# Patient Record
(demographics unavailable — no encounter records)

---

## 2024-10-15 NOTE — ASSESSMENT
[Arterial/Venous Disease] : arterial/venous disease [Other: _____] : [unfilled] [FreeTextEntry1] : Impression symptomatic venous insuff, overall stable    Plan  Med Conservative management leg elevation, thigh high compression stockings 20-30mm Hg, wt loss, diet control, exercise program, protective measures return to office in 6 months April 2025 to evaluate bilateral lower extremities s/o venous insuff  pt's sister Jenny [phone]     Varicose veins are enlarged, twisted veins. Varicose veins are caused by increased blood pressure in the veins.  The blood moves towards the heart by 1-way valves in the veins. When the valves become weakened or damaged, blood can collect in the veins and pool in your lower legs (ankles). This causes the veins to become enlarged and incompetent with reflux. Sitting or standing for long periods can cause blood to pool in the leg veins, increasing the pressure within the veins.  Risk factors for varicose veins or venous disease may include:  obesity, older age, standing or sitting for prolonged periods of time for several years, being female, pregnancy, taking oral contraceptive pills or hormone replacement, being inactive, and/or smoking.  The most common symptoms of varicose veins are sensations in the legs, such as a heavy feeling, burning, and/or aching. However, each individual may experience symptoms differently.  Other symptoms may include:  color changes in the skin, sores on the legs, or rash.  Severe varicose veins or venous disease may eventually produce long-term mild swelling that can result in more serious skin and tissue problems, such as ulcers and non-healing sores. Varicose veins and venous disease are diagnosed by a complete medical history, physical examination, and diagnostic studies for varicose veins including duplex ultrasound and color-flow imaging.   Medical treatment for varicose veins and venous disease include:  compression stockings, sclerotherapy, endovenous ablation and/or surgical treatment with microphlebectomy.

## 2024-10-15 NOTE — HISTORY OF PRESENT ILLNESS
[FreeTextEntry1] : pt was referred by Dr Tyron OWENS to eval for venous insuff\par  \par  Pt c/o bilateral leg pain swelling and itching  which is better since last ov\par  pt is compliant w comp stockings\par  intensity mild \par  \par  \par   [de-identified] : pt is here to evaluate bilateral legs s/p right gsv rfa 4/2024 and lle stab phleb 5/2024 accompanied by aide denies new leg edema or discomfort compliant with thigh high compression stockings he elevates his legs at night no wounds

## 2024-10-15 NOTE — PHYSICAL EXAM
[1+] : left 1+ [2+] : left 2+ [Ankle Swelling (On Exam)] : present [Ankle Swelling Bilaterally] : bilaterally  [Varicose Veins Of Lower Extremities] : bilaterally [] : bilaterally [Ankle Swelling On The Right] : mild [No Rash or Lesion] : No rash or lesion [Alert] : alert [Oriented to Person] : oriented to person [Oriented to Place] : oriented to place [Oriented to Time] : oriented to time [Calm] : calm [JVD] : no jugular venous distention  [de-identified] : nad [de-identified] : wnl [de-identified] : no resp distress [FreeTextEntry1] : Moderate bilateral  leg venous insufficiency  w mild bilateral leg stasis dermatitis  and  moderate bilateral leg edema  Multiple  bilateral leg large and tortuous varicose  veins and spider veins  left medial thigh and  proximal calf 1-3 mm  no wounds/ulcers  [de-identified] : wnl [de-identified] : cooperative [de-identified] : Ranulfo Cranial nerves 2-12 ranulfo grossly intact

## 2024-10-15 NOTE — DATA REVIEWED
[FreeTextEntry1] : Outside facility  report reviewed 9/3/2019 Venous duplex ranulfo le no acute dvt svt   9/27/2019 Venous Doppler Ranulfo le no acute dvt svt                             RLE no sono evidence of reflux                            LLE GSV insuff from sfj to  knee w insuff calf collaterals  2/8/2022 Venous Doppler Ranulfo le no acute dvt svt                             RLE GSV insuff from sfj to  knee w insuff calf collaterals                            LLE GSV insuff from sfj to  knee w insuff calf collaterals  3/26/2024 Venous Doppler Ranulfo le no acute dvt svt                           RLE GSV insuff from sfj to gsv distal calf with insuff thigh and calf collaterals                           LLE GSV insuff from sfj to gsv distal calf with insuff thigh and calf collaterals

## 2025-04-29 NOTE — HISTORY OF PRESENT ILLNESS
[FreeTextEntry1] : pt was referred by Dr Tyron OWENS to eval for venous insuff\par  \par  Pt c/o bilateral leg pain swelling and itching  which is better since last ov\par  pt is compliant w comp stockings\par  intensity mild \par  \par  \par   [de-identified] : pt is here to evaluate bilateral legs s/p right gsv rfa 4/2024 and lle stab phleb 5/2024 accompanied by aide reports leg swelling is stable denies new varicose veins he is compliant with knee high compression stockings and leg elevation reports pain on rt toes 2,3  he dropped an object on his toes denies wounds or drainage

## 2025-04-29 NOTE — ASSESSMENT
[FreeTextEntry1] : Impression symptomatic venous insuff, overall stable    Plan  Med Conservative management leg elevation, knee high compression stockings 20-30mm Hg (rx given), wt loss, diet control, exercise program, protective measures, moisturize skin return to office in 6 months October 2025 to evaluate bilateral lower extremities   pt's sister Jenny [phone]     Varicose veins are enlarged, twisted veins. Varicose veins are caused by increased blood pressure in the veins.  The blood moves towards the heart by 1-way valves in the veins. When the valves become weakened or damaged, blood can collect in the veins and pool in your lower legs (ankles). This causes the veins to become enlarged and incompetent with reflux. Sitting or standing for long periods can cause blood to pool in the leg veins, increasing the pressure within the veins.  Risk factors for varicose veins or venous disease may include:  obesity, older age, standing or sitting for prolonged periods of time for several years, being female, pregnancy, taking oral contraceptive pills or hormone replacement, being inactive, and/or smoking.  The most common symptoms of varicose veins are sensations in the legs, such as a heavy feeling, burning, and/or aching. However, each individual may experience symptoms differently.  Other symptoms may include:  color changes in the skin, sores on the legs, or rash.  Severe varicose veins or venous disease may eventually produce long-term mild swelling that can result in more serious skin and tissue problems, such as ulcers and non-healing sores. Varicose veins and venous disease are diagnosed by a complete medical history, physical examination, and diagnostic studies for varicose veins including duplex ultrasound and color-flow imaging.   Medical treatment for varicose veins and venous disease include:  compression stockings, sclerotherapy, endovenous ablation and/or surgical treatment with microphlebectomy.      [Arterial/Venous Disease] : arterial/venous disease [Other: _____] : [unfilled]

## 2025-04-29 NOTE — PHYSICAL EXAM
[JVD] : no jugular venous distention  [1+] : left 1+ [2+] : left 2+ [Ankle Swelling (On Exam)] : present [Ankle Swelling Bilaterally] : bilaterally  [Varicose Veins Of Lower Extremities] : bilaterally [] : bilaterally [Ankle Swelling On The Right] : mild [No Rash or Lesion] : No rash or lesion [Alert] : alert [Oriented to Person] : oriented to person [Oriented to Place] : oriented to place [Oriented to Time] : oriented to time [Calm] : calm [de-identified] : nad [de-identified] : wnl [de-identified] : no resp distress [FreeTextEntry1] : Moderate bilateral  leg venous insufficiency  w mild bilateral leg stasis dermatitis  and  moderate bilateral leg edema  Multiple  bilateral leg large and tortuous varicose  veins and spider veins  left medial thigh and  proximal calf 1-3 mm  no wounds/ulcers  [de-identified] : wnl [de-identified] : Ranulfo Cranial nerves 2-12 ranulfo grossly intact [de-identified] : cooperative